# Patient Record
Sex: FEMALE | Race: WHITE | NOT HISPANIC OR LATINO | URBAN - METROPOLITAN AREA
[De-identification: names, ages, dates, MRNs, and addresses within clinical notes are randomized per-mention and may not be internally consistent; named-entity substitution may affect disease eponyms.]

---

## 2020-10-22 ENCOUNTER — OFFICE VISIT (OUTPATIENT)
Dept: URBAN - METROPOLITAN AREA CLINIC 94 | Facility: CLINIC | Age: 40
End: 2020-10-22
Payer: OTHER GOVERNMENT

## 2020-10-22 DIAGNOSIS — K76.0 FATTY LIVER: ICD-10-CM

## 2020-10-22 DIAGNOSIS — E66.01 MORBID OBESITY: ICD-10-CM

## 2020-10-22 DIAGNOSIS — R79.89 ELEVATED LFTS: ICD-10-CM

## 2020-10-22 PROBLEM — 197321007 FATTY LIVER: Status: ACTIVE | Noted: 2020-10-22

## 2020-10-22 PROBLEM — 238136002 MORBID OBESITY: Status: ACTIVE | Noted: 2020-10-22

## 2020-10-22 PROCEDURE — G9903 PT SCRN TBCO ID AS NON USER: HCPCS | Performed by: INTERNAL MEDICINE

## 2020-10-22 PROCEDURE — G8417 CALC BMI ABV UP PARAM F/U: HCPCS | Performed by: INTERNAL MEDICINE

## 2020-10-22 PROCEDURE — G8484 FLU IMMUNIZE NO ADMIN: HCPCS | Performed by: INTERNAL MEDICINE

## 2020-10-22 PROCEDURE — 82247 BILIRUBIN TOTAL: CPT | Performed by: INTERNAL MEDICINE

## 2020-10-22 PROCEDURE — G8427 DOCREV CUR MEDS BY ELIG CLIN: HCPCS | Performed by: INTERNAL MEDICINE

## 2020-10-22 PROCEDURE — 99203 OFFICE O/P NEW LOW 30 MIN: CPT | Performed by: INTERNAL MEDICINE

## 2020-10-22 PROCEDURE — 82977 ASSAY OF GGT: CPT | Performed by: INTERNAL MEDICINE

## 2020-10-22 PROCEDURE — 83883 ASSAY NEPHELOMETRY NOT SPEC: CPT | Performed by: INTERNAL MEDICINE

## 2020-10-22 PROCEDURE — 83520 IMMUNOASSAY QUANT NOS NONAB: CPT | Performed by: INTERNAL MEDICINE

## 2020-10-22 PROCEDURE — 80074 ACUTE HEPATITIS PANEL: CPT | Performed by: INTERNAL MEDICINE

## 2020-10-22 RX ORDER — SERTRALINE HYDROCHLORIDE 100 MG/1
1 TABLET TABLET ORAL ONCE A DAY
Status: ACTIVE | COMMUNITY

## 2020-10-22 NOTE — HPI-TODAY'S VISIT:
The patient is being evaluated today for intial office visit for fatty liver. She states that she was diagnosed ~10yrs ago while living in [place]. She intially had elevated LFTs and later sent to GI. US was ordered and she was told that she did not have cirrhosis. She was never really told what to do to manage this whic h is why she is here today.  The patient has also been recommended to start BCP and she wanted to confirm this was not going to be a problem. She denies ETOH use, DM or hyperlipidemia but also states that she does not see an MD on a routine basis.

## 2020-10-22 NOTE — PHYSICAL EXAM GASTROINTESTINAL
Abdomen obese, soft, mild generalized abdominal tenderness, nondistended , no guarding or rigidity , no masses palpable , normal bowel sounds , Liver and Spleen , no hepatomegaly present ,

## 2020-10-28 LAB
A/G RATIO: 1.9
ALBUMIN: 4.5
ALKALINE PHOSPHATASE: 100
ALPHA-1-ANTITRYPSIN, SERUM: 181
ALT (SGPT): 25
ANA DIRECT: NEGATIVE
ANTI-SMOOTH MUSCLE AB BY IFA: (no result)
AST (SGOT): 28
BILIRUBIN, TOTAL: 0.3
BUN/CREATININE RATIO: 14
BUN: 10
CALCIUM: 9.6
CARBON DIOXIDE, TOTAL: 21
CERULOPLASMIN: 37.4
CHLORIDE: 101
CREATININE: 0.72
EGFR IF AFRICN AM: 120
EGFR IF NONAFRICN AM: 104
FERRITIN, SERUM: 109
GLOBULIN, TOTAL: 2.4
GLUCOSE: 79
HBSAG SCREEN: NEGATIVE
HEP A AB, IGM: NEGATIVE
HEP B CORE AB, IGM: NEGATIVE
HEP C VIRUS AB: 0.1
IRON BIND.CAP.(TIBC): 381
IRON SATURATION: 18
IRON: 67
MITOCHONDRIAL (M2) ANTIBODY: <20
POTASSIUM: 4.6
PROTEIN, TOTAL: 6.9
SODIUM: 136
UIBC: 314

## 2020-11-19 ENCOUNTER — CLAIMS CREATED FROM THE CLAIM WINDOW (OUTPATIENT)
Dept: URBAN - METROPOLITAN AREA CLINIC 94 | Facility: CLINIC | Age: 40
End: 2020-11-19
Payer: OTHER GOVERNMENT

## 2020-11-19 ENCOUNTER — DASHBOARD ENCOUNTERS (OUTPATIENT)
Age: 40
End: 2020-11-19

## 2020-11-19 ENCOUNTER — WEB ENCOUNTER (OUTPATIENT)
Dept: URBAN - METROPOLITAN AREA CLINIC 94 | Facility: CLINIC | Age: 40
End: 2020-11-19

## 2020-11-19 VITALS
SYSTOLIC BLOOD PRESSURE: 140 MMHG | WEIGHT: 293 LBS | HEIGHT: 68 IN | DIASTOLIC BLOOD PRESSURE: 86 MMHG | HEART RATE: 73 BPM | TEMPERATURE: 97.5 F | BODY MASS INDEX: 44.41 KG/M2

## 2020-11-19 DIAGNOSIS — R16.0 HEPATOMEGALY: ICD-10-CM

## 2020-11-19 PROBLEM — 197321007 STEATOSIS OF LIVER: Status: ACTIVE | Noted: 2020-11-19

## 2020-11-19 PROCEDURE — 82247 BILIRUBIN TOTAL: CPT | Performed by: INTERNAL MEDICINE

## 2020-11-19 PROCEDURE — 99213 OFFICE O/P EST LOW 20 MIN: CPT | Performed by: INTERNAL MEDICINE

## 2020-11-19 PROCEDURE — G9903 PT SCRN TBCO ID AS NON USER: HCPCS | Performed by: INTERNAL MEDICINE

## 2020-11-19 PROCEDURE — 1036F TOBACCO NON-USER: CPT | Performed by: INTERNAL MEDICINE

## 2020-11-19 PROCEDURE — 83883 ASSAY NEPHELOMETRY NOT SPEC: CPT | Performed by: INTERNAL MEDICINE

## 2020-11-19 PROCEDURE — G8484 FLU IMMUNIZE NO ADMIN: HCPCS | Performed by: INTERNAL MEDICINE

## 2020-11-19 PROCEDURE — G8417 CALC BMI ABV UP PARAM F/U: HCPCS | Performed by: INTERNAL MEDICINE

## 2020-11-19 PROCEDURE — 83520 IMMUNOASSAY QUANT NOS NONAB: CPT | Performed by: INTERNAL MEDICINE

## 2020-11-19 PROCEDURE — 82977 ASSAY OF GGT: CPT | Performed by: INTERNAL MEDICINE

## 2020-11-19 PROCEDURE — G8427 DOCREV CUR MEDS BY ELIG CLIN: HCPCS | Performed by: INTERNAL MEDICINE

## 2020-11-19 RX ORDER — SERTRALINE HYDROCHLORIDE 100 MG/1
1 TABLET TABLET ORAL ONCE A DAY
Status: ACTIVE | COMMUNITY

## 2020-11-19 NOTE — HPI-TODAY'S VISIT:
The patient returns today for f/u visit. CT scan revealed mild heptomegaly . Labs PARIS, AMA, ASA negative. Hepatic function was normal. Acute hepatitis negative.   The patient has a hx of fatty liver. She states that she was diagnosed ~10yrs ago while living in [place]. She intially had elevated LFTs and later sent to GI. US was ordered and she was told that she did not have cirrhosis. She was never really told what to do to manage this whic h is why she is here today.  The patient has also been recommended to start BCP and she wanted to confirm this was not going to be a problem. She denies ETOH use, DM or hyperlipidemia but also states that she does not see an MD on a routine basis.

## 2020-11-19 NOTE — PHYSICAL EXAM GASTROINTESTINAL
Abdomen obese, soft, nontender, nondistended , no guarding or rigidity , no masses palpable , normal bowel sounds , Liver and Spleen , no hepatomegaly present , no hepatosplenomegaly , liver nontender , spleen not palpable

## 2020-11-24 LAB
ALPHA 2-MACROGLOBULINS, QN: 187
ALT (SGPT) P5P: 12
APOLIPOPROTEIN A-1: 202
BILIRUBIN, TOTAL: 0.2
COMMENT:: (no result)
FIBROSIS SCORE: 0.03
FIBROSIS SCORING:: (no result)
FIBROSIS STAGE: (no result)
GGT: 32
HAPTOGLOBIN: 187
INTERPRETATIONS:: (no result)
LIMITATIONS:: (no result)
NECROINFLAMM ACTIVITY SCORING:: (no result)
NECROINFLAMMAT ACTIVITY GRADE: (no result)
NECROINFLAMMAT ACTIVITY SCORE: 0.02

## 2021-05-19 ENCOUNTER — OFFICE VISIT (OUTPATIENT)
Dept: URBAN - METROPOLITAN AREA CLINIC 94 | Facility: CLINIC | Age: 41
End: 2021-05-19